# Patient Record
Sex: FEMALE | Race: WHITE
[De-identification: names, ages, dates, MRNs, and addresses within clinical notes are randomized per-mention and may not be internally consistent; named-entity substitution may affect disease eponyms.]

---

## 2020-07-15 ENCOUNTER — HOSPITAL ENCOUNTER (INPATIENT)
Dept: HOSPITAL 92 - L&D/OP | Age: 30
LOS: 1 days | Discharge: HOME | End: 2020-07-16
Attending: OBSTETRICS & GYNECOLOGY | Admitting: OBSTETRICS & GYNECOLOGY
Payer: COMMERCIAL

## 2020-07-15 VITALS — BODY MASS INDEX: 23.3 KG/M2
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
## 2020-07-15 DIAGNOSIS — Z37.1: ICD-10-CM

## 2020-07-15 DIAGNOSIS — O36.4XX0: Primary | ICD-10-CM

## 2020-07-15 DIAGNOSIS — Z3A.23: ICD-10-CM

## 2020-07-15 DIAGNOSIS — O9A.212: ICD-10-CM

## 2020-07-15 DIAGNOSIS — S70.02XA: ICD-10-CM

## 2020-07-15 LAB
APTT HEX PL PPP: 3.1 SEC (ref 0–8)
APTT PPP: 27.2 SEC (ref 22.9–36.1)
B2 GLYCOPROT1 IGA SERPL IA-ACNC: 1.3 U/ML
B2 GLYCOPROT1 IGG SERPL IA-ACNC: (no result) U/ML
B2 GLYCOPROT1 IGM SERPL IA-ACNC: 3.3 U/ML
B2-GPI ELIA 7-10 INTERP: (no result)
CONFIRM DRVVT: 26.1 S
ELIA APS NEW METHOD: (no result)
ELIA CARD IGA 14-20 INTERP: (no result)
ELIA CARD IGG/M 10-40 INTERP: (no result)
FACT VIII ACT/NOR PPP: 215.7 % ACTIVE (ref 56–157)
HBSAG INDEX: 0.09 S/CO (ref 0–0.99)
HEX PHOS LA TUBE 1: 43.9 SEC
HEX PHOS LA TUBE 2: 40.8 SEC
HGB BLD-MCNC: 13.6 G/DL (ref 12–16)
INR PPP: 0.9
MCH RBC QN AUTO: 33.6 PG (ref 27–31)
MCV RBC AUTO: 97.4 FL (ref 78–98)
PLATELET # BLD AUTO: 218 THOU/UL (ref 130–400)
PROTHROMBIN TIME: 12.3 SEC (ref 12–14.7)
RBC # BLD AUTO: 4.07 MILL/UL (ref 4.2–5.4)
SCREEN DRVVT/NORMAL: 1.2 RATIO
SYPHILIS ANTIBODY INDEX: 0.03 S/CO
TSH SERPL DL<=0.005 MIU/L-ACNC: 1.35 UIU/ML (ref 0.35–4.94)
WBC # BLD AUTO: 12.1 THOU/UL (ref 4.8–10.8)

## 2020-07-15 PROCEDURE — 85613 RUSSELL VIPER VENOM DILUTED: CPT

## 2020-07-15 PROCEDURE — 85240 CLOT FACTOR VIII AHG 1 STAGE: CPT

## 2020-07-15 PROCEDURE — 86146 BETA-2 GLYCOPROTEIN ANTIBODY: CPT

## 2020-07-15 PROCEDURE — S0020 INJECTION, BUPIVICAINE HYDRO: HCPCS

## 2020-07-15 PROCEDURE — 86147 CARDIOLIPIN ANTIBODY EA IG: CPT

## 2020-07-15 PROCEDURE — 85598 HEXAGNAL PHOSPH PLTLT NEUTRL: CPT

## 2020-07-15 PROCEDURE — 86780 TREPONEMA PALLIDUM: CPT

## 2020-07-15 PROCEDURE — 85250 CLOT FACTOR IX PTC/CHRSTMAS: CPT

## 2020-07-15 PROCEDURE — 86850 RBC ANTIBODY SCREEN: CPT

## 2020-07-15 PROCEDURE — 36415 COLL VENOUS BLD VENIPUNCTURE: CPT

## 2020-07-15 PROCEDURE — 85027 COMPLETE CBC AUTOMATED: CPT

## 2020-07-15 PROCEDURE — 87340 HEPATITIS B SURFACE AG IA: CPT

## 2020-07-15 PROCEDURE — 86900 BLOOD TYPING SEROLOGIC ABO: CPT

## 2020-07-15 PROCEDURE — 86901 BLOOD TYPING SEROLOGIC RH(D): CPT

## 2020-07-15 PROCEDURE — 76815 OB US LIMITED FETUS(S): CPT

## 2020-07-15 PROCEDURE — 84443 ASSAY THYROID STIM HORMONE: CPT
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
## 2020-07-16 LAB
FACT IX ACT/NOR PPP: 111.3 % ACTIVE (ref 56–149)
HBSAG INDEX: 0.14 S/CO (ref 0–0.99)
HGB BLD-MCNC: 13.8 G/DL (ref 12–16)
MCH RBC QN AUTO: 32.7 PG (ref 27–31)
MCV RBC AUTO: 98.4 FL (ref 78–98)
PLATELET # BLD AUTO: 202 THOU/UL (ref 130–400)
RBC # BLD AUTO: 4.22 MILL/UL (ref 4.2–5.4)
SYPHILIS ANTIBODY INDEX: 0.03 S/CO
WBC # BLD AUTO: 16.9 THOU/UL (ref 4.8–10.8)

## 2020-07-16 PROCEDURE — 3E0P7VZ INTRODUCTION OF HORMONE INTO FEMALE REPRODUCTIVE, VIA NATURAL OR ARTIFICIAL OPENING: ICD-10-PCS | Performed by: OBSTETRICS & GYNECOLOGY

## 2020-07-16 RX ADMIN — Medication PRN ML: at 07:37

## 2020-07-16 RX ADMIN — Medication PRN ML: at 10:44

## 2020-07-16 RX ADMIN — Medication PRN ML: at 09:42

## 2020-07-16 NOTE — PDOC.OPDEL
OB Operative/Delivery Note


Delivery Dr/Surgeon: Jewel


Pre-Delivery Diagnosis: medically indicated induction (fetal demise 23 weeks)


Weeks gestation: 23


Anesthesia: epidural





- Findings


  ** A


Sex: male





- Additional Findings/Plan


Placenta delivered: spontaneous


Repaired Obstetrical Laceration: none


Estimated blood loss: 200ml


Compilations/Other Findings: 





normal male fetus, age appropriate, some brusing/edema around scalp but no 

obvious defects or trauma, normal appearing placenta


Post delivery plan: routine recovery (plan for DC home this PM)
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
